# Patient Record
Sex: FEMALE | Race: WHITE | NOT HISPANIC OR LATINO | Employment: FULL TIME | URBAN - METROPOLITAN AREA
[De-identification: names, ages, dates, MRNs, and addresses within clinical notes are randomized per-mention and may not be internally consistent; named-entity substitution may affect disease eponyms.]

---

## 2022-05-03 ENCOUNTER — OFFICE VISIT (OUTPATIENT)
Dept: OBSTETRICS AND GYNECOLOGY | Facility: CLINIC | Age: 52
End: 2022-05-03
Payer: COMMERCIAL

## 2022-05-03 VITALS
DIASTOLIC BLOOD PRESSURE: 82 MMHG | HEIGHT: 65 IN | BODY MASS INDEX: 30.07 KG/M2 | WEIGHT: 180.5 LBS | SYSTOLIC BLOOD PRESSURE: 128 MMHG

## 2022-05-03 DIAGNOSIS — Z78.9 PARTICIPANT IN HEALTH AND WELLNESS PLAN: ICD-10-CM

## 2022-05-03 DIAGNOSIS — Z12.31 SCREENING MAMMOGRAM FOR BREAST CANCER: Primary | ICD-10-CM

## 2022-05-03 DIAGNOSIS — N39.3 STRESS INCONTINENCE, FEMALE: ICD-10-CM

## 2022-05-03 PROCEDURE — 99386 PR PREVENTIVE VISIT,NEW,40-64: ICD-10-PCS | Mod: S$GLB,,, | Performed by: OBSTETRICS & GYNECOLOGY

## 2022-05-03 PROCEDURE — 3074F PR MOST RECENT SYSTOLIC BLOOD PRESSURE < 130 MM HG: ICD-10-PCS | Mod: S$GLB,,, | Performed by: OBSTETRICS & GYNECOLOGY

## 2022-05-03 PROCEDURE — 1159F PR MEDICATION LIST DOCUMENTED IN MEDICAL RECORD: ICD-10-PCS | Mod: S$GLB,,, | Performed by: OBSTETRICS & GYNECOLOGY

## 2022-05-03 PROCEDURE — 1160F PR REVIEW ALL MEDS BY PRESCRIBER/CLIN PHARMACIST DOCUMENTED: ICD-10-PCS | Mod: S$GLB,,, | Performed by: OBSTETRICS & GYNECOLOGY

## 2022-05-03 PROCEDURE — 3079F PR MOST RECENT DIASTOLIC BLOOD PRESSURE 80-89 MM HG: ICD-10-PCS | Mod: S$GLB,,, | Performed by: OBSTETRICS & GYNECOLOGY

## 2022-05-03 PROCEDURE — 3074F SYST BP LT 130 MM HG: CPT | Mod: S$GLB,,, | Performed by: OBSTETRICS & GYNECOLOGY

## 2022-05-03 PROCEDURE — 1159F MED LIST DOCD IN RCRD: CPT | Mod: S$GLB,,, | Performed by: OBSTETRICS & GYNECOLOGY

## 2022-05-03 PROCEDURE — 3008F BODY MASS INDEX DOCD: CPT | Mod: S$GLB,,, | Performed by: OBSTETRICS & GYNECOLOGY

## 2022-05-03 PROCEDURE — 1160F RVW MEDS BY RX/DR IN RCRD: CPT | Mod: S$GLB,,, | Performed by: OBSTETRICS & GYNECOLOGY

## 2022-05-03 PROCEDURE — 87086 URINE CULTURE/COLONY COUNT: CPT | Performed by: OBSTETRICS & GYNECOLOGY

## 2022-05-03 PROCEDURE — 3079F DIAST BP 80-89 MM HG: CPT | Mod: S$GLB,,, | Performed by: OBSTETRICS & GYNECOLOGY

## 2022-05-03 PROCEDURE — 3008F PR BODY MASS INDEX (BMI) DOCUMENTED: ICD-10-PCS | Mod: S$GLB,,, | Performed by: OBSTETRICS & GYNECOLOGY

## 2022-05-03 PROCEDURE — 99386 PREV VISIT NEW AGE 40-64: CPT | Mod: S$GLB,,, | Performed by: OBSTETRICS & GYNECOLOGY

## 2022-05-03 RX ORDER — LISDEXAMFETAMINE DIMESYLATE 70 MG/1
70 CAPSULE ORAL EVERY MORNING
COMMUNITY
Start: 2022-04-03

## 2022-05-03 RX ORDER — SERTRALINE HYDROCHLORIDE 25 MG/1
25 TABLET, FILM COATED ORAL DAILY
COMMUNITY
Start: 2022-04-22

## 2022-05-03 RX ORDER — BUPROPION HYDROCHLORIDE 300 MG/1
300 TABLET ORAL DAILY
COMMUNITY
Start: 2022-04-22

## 2022-05-03 NOTE — PROGRESS NOTES
Subjective:       Patient ID: Allie Coto is a 51 y.o. female.    Chief Complaint: Establish Care (Pt is new to the clinic and would like to est. Care. Pt states she has been experiencing some stress incontinent   episodes.)  Pt moved from [facility]-now working at [employer] and is establishing care-not due for her pap until after mid June  Struggles with stress incontinence and curious about how she can improve her symptoms   Menses is regular-maybe a little heavier than normal  No FMH of BRCA or colon CA  HPI  Review of Systems   Constitutional: Negative for chills and fever.   Gastrointestinal: Positive for constipation. Negative for abdominal pain, diarrhea and nausea.   Genitourinary: Positive for bladder incontinence and urgency. Negative for dysuria, flank pain, frequency, hematuria, pelvic pain and vaginal discharge.   Musculoskeletal: Positive for back pain.   Integumentary:  Negative for rash.   Neurological: Positive for headaches.         Objective:      Physical Exam  Vitals and nursing note reviewed. Exam conducted with a chaperone present.   Constitutional:       Appearance: Normal appearance.   HENT:      Head: Normocephalic and atraumatic.   Pulmonary:      Effort: Pulmonary effort is normal.   Musculoskeletal:      Cervical back: Normal range of motion and neck supple.   Neurological:      Mental Status: She is alert.         Assessment:       Problem List Items Addressed This Visit    None         Plan:       Screening mammogram for breast cancer  -     Mammo Digital Screening Bilat; Future; Expected date: 05/03/2022    Stress incontinence, female  -     Urine culture    Participant in health and wellness plan  -     Hemoglobin A1C; Future; Expected date: 05/03/2022  -     Lipid Panel; Future; Expected date: 05/03/2022  -     Ambulatory referral/consult to Gastroenterology; Future; Expected date: 05/10/2022      Disc Kegels and pelvic floor PT and poss of surgical procedures if lifestyle is badly  affected  Counseled about menopause and annual exams  Will return for labs and have MMg and colonoscopy

## 2022-05-05 LAB — BACTERIA UR CULT: NORMAL

## 2022-05-06 ENCOUNTER — LAB VISIT (OUTPATIENT)
Dept: LAB | Facility: CLINIC | Age: 52
End: 2022-05-06
Payer: COMMERCIAL

## 2022-05-06 DIAGNOSIS — Z78.9 PARTICIPANT IN HEALTH AND WELLNESS PLAN: ICD-10-CM

## 2022-05-06 LAB
CHOLEST SERPL-MCNC: 131 MG/DL (ref 120–199)
CHOLEST/HDLC SERPL: 3.4 {RATIO} (ref 2–5)
HDLC SERPL-MCNC: 39 MG/DL (ref 40–75)
HDLC SERPL: 29.8 % (ref 20–50)
LDLC SERPL CALC-MCNC: 83 MG/DL (ref 63–159)
NONHDLC SERPL-MCNC: 92 MG/DL
TRIGL SERPL-MCNC: 45 MG/DL (ref 30–150)

## 2022-05-06 PROCEDURE — 36415 COLL VENOUS BLD VENIPUNCTURE: CPT | Mod: ,,, | Performed by: OBSTETRICS & GYNECOLOGY

## 2022-05-06 PROCEDURE — 83036 HEMOGLOBIN GLYCOSYLATED A1C: CPT | Performed by: OBSTETRICS & GYNECOLOGY

## 2022-05-06 PROCEDURE — 36415 PR COLLECTION VENOUS BLOOD,VENIPUNCTURE: ICD-10-PCS | Mod: ,,, | Performed by: OBSTETRICS & GYNECOLOGY

## 2022-05-06 PROCEDURE — 80061 LIPID PANEL: CPT | Performed by: OBSTETRICS & GYNECOLOGY

## 2022-05-07 LAB
ESTIMATED AVG GLUCOSE: 108 MG/DL (ref 68–131)
HBA1C MFR BLD: 5.4 % (ref 4–5.6)

## 2022-05-09 ENCOUNTER — TELEPHONE (OUTPATIENT)
Dept: OBSTETRICS AND GYNECOLOGY | Facility: CLINIC | Age: 52
End: 2022-05-09
Payer: COMMERCIAL

## 2022-05-09 NOTE — TELEPHONE ENCOUNTER
"Called pt no answer, message left.  ----- Message from Orion Bruce MD sent at 5/9/2022 10:41 AM CDT -----  HDL "good" cholesterol is borderline, otherwise all labs are normal.  Rec increase exercise and fresh fruits and vegetables in her diet  Avoid fried and fatty foods  Dr Bruce    "

## 2022-05-09 NOTE — TELEPHONE ENCOUNTER
Called pt no answer, message left.    ----- Message from Orion Bruce MD sent at 5/9/2022  9:13 AM CDT -----  Urine culture is negative  Dr Bruce

## 2022-05-20 ENCOUNTER — PATIENT MESSAGE (OUTPATIENT)
Dept: OBSTETRICS AND GYNECOLOGY | Facility: CLINIC | Age: 52
End: 2022-05-20
Payer: COMMERCIAL

## 2022-06-15 ENCOUNTER — PATIENT MESSAGE (OUTPATIENT)
Dept: OBSTETRICS AND GYNECOLOGY | Facility: CLINIC | Age: 52
End: 2022-06-15
Payer: COMMERCIAL

## 2022-06-16 DIAGNOSIS — B37.31 VAGINAL YEAST INFECTION: Primary | ICD-10-CM

## 2022-06-16 RX ORDER — FLUCONAZOLE 150 MG/1
150 TABLET ORAL DAILY
Qty: 1 TABLET | Refills: 1 | Status: SHIPPED | OUTPATIENT
Start: 2022-06-16 | End: 2022-06-17

## 2022-10-27 ENCOUNTER — OFFICE VISIT (OUTPATIENT)
Dept: OBSTETRICS AND GYNECOLOGY | Facility: CLINIC | Age: 52
End: 2022-10-27
Payer: COMMERCIAL

## 2022-10-27 VITALS
WEIGHT: 180.31 LBS | TEMPERATURE: 98 F | DIASTOLIC BLOOD PRESSURE: 90 MMHG | OXYGEN SATURATION: 100 % | BODY MASS INDEX: 30.04 KG/M2 | SYSTOLIC BLOOD PRESSURE: 140 MMHG | HEART RATE: 82 BPM | HEIGHT: 65 IN

## 2022-10-27 DIAGNOSIS — Z01.419 WOMEN'S ANNUAL ROUTINE GYNECOLOGICAL EXAMINATION: Primary | ICD-10-CM

## 2022-10-27 DIAGNOSIS — R19.00 PELVIC FULLNESS IN FEMALE: ICD-10-CM

## 2022-10-27 PROCEDURE — 3077F SYST BP >= 140 MM HG: CPT | Mod: S$GLB,,, | Performed by: OBSTETRICS & GYNECOLOGY

## 2022-10-27 PROCEDURE — 87624 HPV HI-RISK TYP POOLED RSLT: CPT | Performed by: OBSTETRICS & GYNECOLOGY

## 2022-10-27 PROCEDURE — 1160F RVW MEDS BY RX/DR IN RCRD: CPT | Mod: S$GLB,,, | Performed by: OBSTETRICS & GYNECOLOGY

## 2022-10-27 PROCEDURE — 3044F HG A1C LEVEL LT 7.0%: CPT | Mod: S$GLB,,, | Performed by: OBSTETRICS & GYNECOLOGY

## 2022-10-27 PROCEDURE — 3080F PR MOST RECENT DIASTOLIC BLOOD PRESSURE >= 90 MM HG: ICD-10-PCS | Mod: S$GLB,,, | Performed by: OBSTETRICS & GYNECOLOGY

## 2022-10-27 PROCEDURE — 3044F PR MOST RECENT HEMOGLOBIN A1C LEVEL <7.0%: ICD-10-PCS | Mod: S$GLB,,, | Performed by: OBSTETRICS & GYNECOLOGY

## 2022-10-27 PROCEDURE — 1159F PR MEDICATION LIST DOCUMENTED IN MEDICAL RECORD: ICD-10-PCS | Mod: S$GLB,,, | Performed by: OBSTETRICS & GYNECOLOGY

## 2022-10-27 PROCEDURE — 1160F PR REVIEW ALL MEDS BY PRESCRIBER/CLIN PHARMACIST DOCUMENTED: ICD-10-PCS | Mod: S$GLB,,, | Performed by: OBSTETRICS & GYNECOLOGY

## 2022-10-27 PROCEDURE — 3077F PR MOST RECENT SYSTOLIC BLOOD PRESSURE >= 140 MM HG: ICD-10-PCS | Mod: S$GLB,,, | Performed by: OBSTETRICS & GYNECOLOGY

## 2022-10-27 PROCEDURE — 88175 CYTOPATH C/V AUTO FLUID REDO: CPT | Performed by: OBSTETRICS & GYNECOLOGY

## 2022-10-27 PROCEDURE — 1159F MED LIST DOCD IN RCRD: CPT | Mod: S$GLB,,, | Performed by: OBSTETRICS & GYNECOLOGY

## 2022-10-27 PROCEDURE — 99396 PR PREVENTIVE VISIT,EST,40-64: ICD-10-PCS | Mod: S$GLB,,, | Performed by: OBSTETRICS & GYNECOLOGY

## 2022-10-27 PROCEDURE — 3080F DIAST BP >= 90 MM HG: CPT | Mod: S$GLB,,, | Performed by: OBSTETRICS & GYNECOLOGY

## 2022-10-27 PROCEDURE — 99396 PREV VISIT EST AGE 40-64: CPT | Mod: S$GLB,,, | Performed by: OBSTETRICS & GYNECOLOGY

## 2022-10-27 NOTE — PROGRESS NOTES
Subjective:       Patient ID: Allie Coto is a 52 y.o. female.    Chief Complaint: Well Woman (Patient c/o feeling bloated.)  No complaints-some variety in menstrual length and amount of bleeding-still comes monthly-having some HF, no Vag dryness, needs pap and c/o pelvic fullness and no change in BM-has BM every 1-3 days  No other c/o  Had labs done in May-normal, had MMG, has not had a colonoscopy  HPI  Review of Systems   Genitourinary:  Positive for hot flashes and menstrual irregularity.       Objective:      Physical Exam  Vitals and nursing note reviewed. Exam conducted with a chaperone present.   Constitutional:       Appearance: Normal appearance. She is obese.   HENT:      Head: Normocephalic and atraumatic.   Cardiovascular:      Rate and Rhythm: Regular rhythm.      Heart sounds: Normal heart sounds.   Pulmonary:      Effort: Pulmonary effort is normal.      Breath sounds: Normal breath sounds.   Chest:   Breasts:     Right: Normal.      Left: Normal.   Genitourinary:     General: Normal vulva.      Exam position: Lithotomy position.      Vagina: Normal.      Cervix: Normal.      Uterus: Normal.       Adnexa:         Right: No mass, tenderness or fullness.          Left: Tenderness and fullness present. No mass.        Comments: Left adnexal fullness with some tenderness  Musculoskeletal:      Cervical back: Normal range of motion and neck supple.   Neurological:      Mental Status: She is alert.       Assessment:       Problem List Items Addressed This Visit    None  Visit Diagnoses       Women's annual routine gynecological examination    -  Primary              Plan:       Women's annual routine gynecological examination  -     Liquid-Based Pap Smear, Screening  -     HPV High Risk Genotypes, PCR  -     Ambulatory referral/consult to Gastroenterology; Future; Expected date: 11/03/2022    Pelvic fullness in female  -     US Pelvis Comp with Transvag NON-OB (xpd; Future; Expected date: 10/27/2022  -      Ambulatory referral/consult to Gastroenterology; Future; Expected date: 11/03/2022    RTC for annual. Will follow up with pt after U/S

## 2022-11-02 LAB
FINAL PATHOLOGIC DIAGNOSIS: NORMAL
HPV HR 12 DNA SPEC QL NAA+PROBE: NEGATIVE
HPV16 AG SPEC QL: NEGATIVE
HPV18 DNA SPEC QL NAA+PROBE: NEGATIVE
Lab: NORMAL

## 2022-11-03 ENCOUNTER — TELEPHONE (OUTPATIENT)
Dept: OBSTETRICS AND GYNECOLOGY | Facility: CLINIC | Age: 52
End: 2022-11-03
Payer: COMMERCIAL

## 2022-11-03 NOTE — TELEPHONE ENCOUNTER
----- Message from Orion Bruce MD sent at 11/3/2022  2:10 PM CDT -----  Normal Pap and negative HPV  Dr Bruce

## 2022-12-20 ENCOUNTER — PATIENT MESSAGE (OUTPATIENT)
Dept: OBSTETRICS AND GYNECOLOGY | Facility: CLINIC | Age: 52
End: 2022-12-20
Payer: COMMERCIAL

## 2023-01-24 ENCOUNTER — PATIENT MESSAGE (OUTPATIENT)
Dept: OBSTETRICS AND GYNECOLOGY | Facility: CLINIC | Age: 53
End: 2023-01-24
Payer: COMMERCIAL

## 2023-01-31 ENCOUNTER — PATIENT MESSAGE (OUTPATIENT)
Dept: OBSTETRICS AND GYNECOLOGY | Facility: CLINIC | Age: 53
End: 2023-01-31
Payer: COMMERCIAL

## 2023-03-21 ENCOUNTER — TELEPHONE (OUTPATIENT)
Dept: OBSTETRICS AND GYNECOLOGY | Facility: CLINIC | Age: 53
End: 2023-03-21

## 2023-03-21 ENCOUNTER — OFFICE VISIT (OUTPATIENT)
Dept: OBSTETRICS AND GYNECOLOGY | Facility: CLINIC | Age: 53
End: 2023-03-21
Payer: COMMERCIAL

## 2023-03-21 VITALS
OXYGEN SATURATION: 100 % | BODY MASS INDEX: 27.16 KG/M2 | SYSTOLIC BLOOD PRESSURE: 141 MMHG | TEMPERATURE: 98 F | HEIGHT: 65 IN | DIASTOLIC BLOOD PRESSURE: 74 MMHG | HEART RATE: 79 BPM | WEIGHT: 163 LBS

## 2023-03-21 DIAGNOSIS — D21.9 FIBROIDS: ICD-10-CM

## 2023-03-21 DIAGNOSIS — N95.1 PERIMENOPAUSE: ICD-10-CM

## 2023-03-21 DIAGNOSIS — N92.6 IRREGULAR MENSES: Primary | ICD-10-CM

## 2023-03-21 DIAGNOSIS — N92.1 MENORRHAGIA WITH IRREGULAR CYCLE: ICD-10-CM

## 2023-03-21 LAB
BASOPHILS # BLD AUTO: 0.05 K/UL (ref 0–0.2)
BASOPHILS NFR BLD: 0.8 % (ref 0–1.9)
DIFFERENTIAL METHOD: ABNORMAL
EOSINOPHIL # BLD AUTO: 0.3 K/UL (ref 0–0.5)
EOSINOPHIL NFR BLD: 4.3 % (ref 0–8)
ERYTHROCYTE [DISTWIDTH] IN BLOOD BY AUTOMATED COUNT: 16.8 % (ref 11.5–14.5)
HCT VFR BLD AUTO: 36.4 % (ref 37–48.5)
HGB BLD-MCNC: 11 G/DL (ref 12–16)
IMM GRANULOCYTES # BLD AUTO: 0.01 K/UL (ref 0–0.04)
IMM GRANULOCYTES NFR BLD AUTO: 0.2 % (ref 0–0.5)
LYMPHOCYTES # BLD AUTO: 1.5 K/UL (ref 1–4.8)
LYMPHOCYTES NFR BLD: 22.6 % (ref 18–48)
MCH RBC QN AUTO: 22 PG (ref 27–31)
MCHC RBC AUTO-ENTMCNC: 30.2 G/DL (ref 32–36)
MCV RBC AUTO: 73 FL (ref 82–98)
MONOCYTES # BLD AUTO: 0.5 K/UL (ref 0.3–1)
MONOCYTES NFR BLD: 7.8 % (ref 4–15)
NEUTROPHILS # BLD AUTO: 4.2 K/UL (ref 1.8–7.7)
NEUTROPHILS NFR BLD: 64.3 % (ref 38–73)
NRBC BLD-RTO: 0 /100 WBC
PLATELET # BLD AUTO: 283 K/UL (ref 150–450)
PMV BLD AUTO: 10.8 FL (ref 9.2–12.9)
RBC # BLD AUTO: 5.01 M/UL (ref 4–5.4)
T4 FREE SERPL-MCNC: 1.1 NG/DL (ref 0.71–1.51)
TSH SERPL DL<=0.005 MIU/L-ACNC: 1.71 UIU/ML (ref 0.4–4)
WBC # BLD AUTO: 6.55 K/UL (ref 3.9–12.7)

## 2023-03-21 PROCEDURE — 99214 OFFICE O/P EST MOD 30 MIN: CPT | Mod: S$GLB,,, | Performed by: OBSTETRICS & GYNECOLOGY

## 2023-03-21 PROCEDURE — 3008F PR BODY MASS INDEX (BMI) DOCUMENTED: ICD-10-PCS | Mod: S$GLB,,, | Performed by: OBSTETRICS & GYNECOLOGY

## 2023-03-21 PROCEDURE — 84439 ASSAY OF FREE THYROXINE: CPT | Performed by: OBSTETRICS & GYNECOLOGY

## 2023-03-21 PROCEDURE — 84443 ASSAY THYROID STIM HORMONE: CPT | Performed by: OBSTETRICS & GYNECOLOGY

## 2023-03-21 PROCEDURE — 1159F PR MEDICATION LIST DOCUMENTED IN MEDICAL RECORD: ICD-10-PCS | Mod: S$GLB,,, | Performed by: OBSTETRICS & GYNECOLOGY

## 2023-03-21 PROCEDURE — 3078F DIAST BP <80 MM HG: CPT | Mod: S$GLB,,, | Performed by: OBSTETRICS & GYNECOLOGY

## 2023-03-21 PROCEDURE — 3077F PR MOST RECENT SYSTOLIC BLOOD PRESSURE >= 140 MM HG: ICD-10-PCS | Mod: S$GLB,,, | Performed by: OBSTETRICS & GYNECOLOGY

## 2023-03-21 PROCEDURE — 1159F MED LIST DOCD IN RCRD: CPT | Mod: S$GLB,,, | Performed by: OBSTETRICS & GYNECOLOGY

## 2023-03-21 PROCEDURE — 3078F PR MOST RECENT DIASTOLIC BLOOD PRESSURE < 80 MM HG: ICD-10-PCS | Mod: S$GLB,,, | Performed by: OBSTETRICS & GYNECOLOGY

## 2023-03-21 PROCEDURE — 85025 COMPLETE CBC W/AUTO DIFF WBC: CPT | Performed by: OBSTETRICS & GYNECOLOGY

## 2023-03-21 PROCEDURE — 3077F SYST BP >= 140 MM HG: CPT | Mod: S$GLB,,, | Performed by: OBSTETRICS & GYNECOLOGY

## 2023-03-21 PROCEDURE — 99214 PR OFFICE/OUTPT VISIT, EST, LEVL IV, 30-39 MIN: ICD-10-PCS | Mod: S$GLB,,, | Performed by: OBSTETRICS & GYNECOLOGY

## 2023-03-21 PROCEDURE — 3008F BODY MASS INDEX DOCD: CPT | Mod: S$GLB,,, | Performed by: OBSTETRICS & GYNECOLOGY

## 2023-03-21 NOTE — PROGRESS NOTES
Subjective:       Patient ID: Allie Coto is a 52 y.o. female.    Chief Complaint: Vaginal Bleeding (Has always had heavy cycles but for the past 4 months has been so bad she does not want to leave the house. )  Pt is here to disc menses-has always had heavy days but it is worse-soiling clothes and affecting daily life-the last few months have been worse, longest period without bleeding was about 2 months as she approaches menopause, U/S done in Jan 2023-2 fibroids, normal ovaries, EMS 1 cm  Desires some intervention and is not interested in hysterectomy  No Fmh of DVT, no personal hx of CV ds, took Vyvanse before coming and it elevates her BP for a little while  Reviewed chart and last exam done on 10/27/23, reviewed U/S  Vaginal Bleeding    Review of Systems   Genitourinary:  Positive for menstrual irregularity and vaginal bleeding.       Objective:      Physical Exam  Vitals and nursing note reviewed.   Constitutional:       Appearance: Normal appearance.   HENT:      Head: Normocephalic and atraumatic.   Pulmonary:      Effort: Pulmonary effort is normal.   Neurological:      Mental Status: She is alert.   Psychiatric:         Mood and Affect: Mood normal.         Behavior: Behavior normal.         Thought Content: Thought content normal.       Assessment:       Problem List Items Addressed This Visit    None  Visit Diagnoses       Irregular menses    -  Primary    Relevant Orders    T4, Free    TSH    Menorrhagia with irregular cycle        Relevant Orders    CBC Auto Differential    T4, Free    TSH    Perimenopause        Fibroids                  Plan:       Irregular menses  -     T4, Free; Future; Expected date: 03/21/2023  -     TSH; Future; Expected date: 03/21/2023    Menorrhagia with irregular cycle  -     CBC Auto Differential; Future; Expected date: 03/21/2023  -     T4, Free; Future; Expected date: 03/21/2023  -     TSH; Future; Expected date: 03/21/2023    Perimenopause    Fibroids      Disc  ablation and disc OCPs as an option to help transition through menopause  Pt would like to pursue ablation  Will refer to Dr Mae for pre op consult

## 2023-03-23 NOTE — PROGRESS NOTES
Mild anemia, likely due to blood loss with menses. Thyroid studies are normal. Rec multivitamin and OTC iron supplement and continue with plan for ablation.  Dr Bruce

## 2023-03-27 ENCOUNTER — OFFICE VISIT (OUTPATIENT)
Dept: OBSTETRICS AND GYNECOLOGY | Facility: CLINIC | Age: 53
End: 2023-03-27
Payer: COMMERCIAL

## 2023-03-27 VITALS
HEIGHT: 65 IN | DIASTOLIC BLOOD PRESSURE: 95 MMHG | WEIGHT: 182 LBS | BODY MASS INDEX: 30.32 KG/M2 | SYSTOLIC BLOOD PRESSURE: 145 MMHG

## 2023-03-27 DIAGNOSIS — N92.1 MENORRHAGIA WITH IRREGULAR CYCLE: Primary | ICD-10-CM

## 2023-03-27 PROCEDURE — 1159F PR MEDICATION LIST DOCUMENTED IN MEDICAL RECORD: ICD-10-PCS | Mod: S$GLB,,, | Performed by: OBSTETRICS & GYNECOLOGY

## 2023-03-27 PROCEDURE — 99214 OFFICE O/P EST MOD 30 MIN: CPT | Mod: S$GLB,,, | Performed by: OBSTETRICS & GYNECOLOGY

## 2023-03-27 PROCEDURE — 3008F PR BODY MASS INDEX (BMI) DOCUMENTED: ICD-10-PCS | Mod: S$GLB,,, | Performed by: OBSTETRICS & GYNECOLOGY

## 2023-03-27 PROCEDURE — 3080F PR MOST RECENT DIASTOLIC BLOOD PRESSURE >= 90 MM HG: ICD-10-PCS | Mod: S$GLB,,, | Performed by: OBSTETRICS & GYNECOLOGY

## 2023-03-27 PROCEDURE — 3077F SYST BP >= 140 MM HG: CPT | Mod: S$GLB,,, | Performed by: OBSTETRICS & GYNECOLOGY

## 2023-03-27 PROCEDURE — 3080F DIAST BP >= 90 MM HG: CPT | Mod: S$GLB,,, | Performed by: OBSTETRICS & GYNECOLOGY

## 2023-03-27 PROCEDURE — 3077F PR MOST RECENT SYSTOLIC BLOOD PRESSURE >= 140 MM HG: ICD-10-PCS | Mod: S$GLB,,, | Performed by: OBSTETRICS & GYNECOLOGY

## 2023-03-27 PROCEDURE — 99214 PR OFFICE/OUTPT VISIT, EST, LEVL IV, 30-39 MIN: ICD-10-PCS | Mod: S$GLB,,, | Performed by: OBSTETRICS & GYNECOLOGY

## 2023-03-27 PROCEDURE — 1159F MED LIST DOCD IN RCRD: CPT | Mod: S$GLB,,, | Performed by: OBSTETRICS & GYNECOLOGY

## 2023-03-27 PROCEDURE — 3008F BODY MASS INDEX DOCD: CPT | Mod: S$GLB,,, | Performed by: OBSTETRICS & GYNECOLOGY

## 2023-03-27 NOTE — PROGRESS NOTES
"Gynecology Visit  History & Physical      SUBJECTIVE:     History of Present Illness:  Patient is a 52 y.o. female presents as a referral from Dr. Bruce. Reports that over the past 6 months, her periods have become heavier and irregular; occurring every 6 to 8 weeks. She had an u/s performed by [facility] that showed fibroids. She is interested in surgical treatment.     Chief Complaint   Patient presents with    Consult     Pt is here today for consult visit.       Review of patient's allergies indicates:   Allergen Reactions    Penicillins Hives, Itching and Rash       Current Outpatient Medications   Medication Sig Dispense Refill    buPROPion (WELLBUTRIN XL) 300 MG 24 hr tablet Take 300 mg by mouth once daily.      sertraline (ZOLOFT) 25 MG tablet Take 25 mg by mouth once daily.      VYVANSE 70 mg capsule Take 70 mg by mouth every morning.       No current facility-administered medications for this visit.     OB History          3    Para   3    Term   3            AB        Living   3         SAB        IAB        Ectopic        Multiple        Live Births   3             Patient's last menstrual period was 02/10/2023 (approximate).      Past Medical History:   Diagnosis Date    Breast disorder     Postpartum depression     Rh incompatibility     Trauma      Past Surgical History:   Procedure Laterality Date    BREAST BIOPSY      lab band      TUBAL LIGATION       History reviewed. No pertinent family history.  Social History     Tobacco Use    Smoking status: Never    Smokeless tobacco: Never   Substance Use Topics    Alcohol use: Not Currently    Drug use: Never        OBJECTIVE:     Vital Signs (Most Recent)  BP: (!) 145/95 (23 1025)  5' 5" (1.651 m)  82.6 kg (182 lb)     Physical Exam:  Physical Exam  Vitals reviewed.   Constitutional:       Appearance: Normal appearance.   HENT:      Head: Normocephalic and atraumatic.   Pulmonary:      Effort: Pulmonary effort is normal. "   Neurological:      General: No focal deficit present.      Mental Status: She is alert and oriented to person, place, and time.   Psychiatric:         Mood and Affect: Mood normal.         Behavior: Behavior normal.       Pelvic u/s performed on 1/12/2023:  Uterus measures 9.8 x 6.4 x 6.4 cm  Multiple small fibroids and/ or adenomyosis  EMS measures 1mm  Normal appearing bilateral ovaries    ASSESSMENT/PLAN:       ICD-10-CM ICD-9-CM   1. Menorrhagia with irregular cycle  N92.1 626.2       PLAN:    --Pt is perimenopausal. Menstrual changes are likely hormonal.   --FSH/ E2 ordered to assess if she is in menopause. If so, endometrial ablation is not appropriate and will discuss IUD vs hysterectomy.   --EMB not indicated given thin EMS.   --Will call patient with lab results and follow up plan.   --Consider TXA.     Last Pap: 10/27/2022 ASTER Mae MD   Gynecology    [address]  [address]  [place] [address] [phone]

## 2023-03-29 ENCOUNTER — LAB VISIT (OUTPATIENT)
Dept: LAB | Facility: CLINIC | Age: 53
End: 2023-03-29
Payer: COMMERCIAL

## 2023-03-29 DIAGNOSIS — N92.1 MENORRHAGIA WITH IRREGULAR CYCLE: ICD-10-CM

## 2023-03-29 PROCEDURE — 82670 ASSAY OF TOTAL ESTRADIOL: CPT | Performed by: OBSTETRICS & GYNECOLOGY

## 2023-03-29 PROCEDURE — 83001 ASSAY OF GONADOTROPIN (FSH): CPT | Performed by: OBSTETRICS & GYNECOLOGY

## 2023-03-30 PROCEDURE — 36415 COLL VENOUS BLD VENIPUNCTURE: CPT | Mod: ,,, | Performed by: STUDENT IN AN ORGANIZED HEALTH CARE EDUCATION/TRAINING PROGRAM

## 2023-03-30 PROCEDURE — 36415 PR COLLECTION VENOUS BLOOD,VENIPUNCTURE: ICD-10-PCS | Mod: ,,, | Performed by: STUDENT IN AN ORGANIZED HEALTH CARE EDUCATION/TRAINING PROGRAM

## 2023-03-31 LAB
ESTRADIOL SERPL-MCNC: 11 PG/ML
FSH SERPL-ACNC: 54.93 MIU/ML

## 2023-04-12 ENCOUNTER — TELEPHONE (OUTPATIENT)
Dept: OBSTETRICS AND GYNECOLOGY | Facility: CLINIC | Age: 53
End: 2023-04-12
Payer: COMMERCIAL

## 2023-04-12 NOTE — TELEPHONE ENCOUNTER
----- Message from Sabrina Mae MD sent at 4/3/2023  1:51 PM CDT -----  Please schedule a virtual appointment to discuss lab results and treatment plan. Pt is welcome to come to the office for an in person appointment if she desires.

## 2023-04-14 ENCOUNTER — TELEPHONE (OUTPATIENT)
Dept: OBSTETRICS AND GYNECOLOGY | Facility: CLINIC | Age: 53
End: 2023-04-14
Payer: COMMERCIAL

## 2023-04-24 ENCOUNTER — PATIENT MESSAGE (OUTPATIENT)
Dept: OBSTETRICS AND GYNECOLOGY | Facility: CLINIC | Age: 53
End: 2023-04-24
Payer: COMMERCIAL

## 2023-07-12 ENCOUNTER — OFFICE VISIT (OUTPATIENT)
Dept: OBSTETRICS AND GYNECOLOGY | Facility: CLINIC | Age: 53
End: 2023-07-12
Payer: COMMERCIAL

## 2023-07-12 VITALS
WEIGHT: 185 LBS | HEIGHT: 65 IN | BODY MASS INDEX: 30.82 KG/M2 | DIASTOLIC BLOOD PRESSURE: 88 MMHG | SYSTOLIC BLOOD PRESSURE: 138 MMHG

## 2023-07-12 DIAGNOSIS — D25.1 INTRAMURAL LEIOMYOMA OF UTERUS: Primary | ICD-10-CM

## 2023-07-12 DIAGNOSIS — N95.0 POST-MENOPAUSAL BLEEDING: ICD-10-CM

## 2023-07-12 PROCEDURE — 1159F MED LIST DOCD IN RCRD: CPT | Mod: S$GLB,,, | Performed by: OBSTETRICS & GYNECOLOGY

## 2023-07-12 PROCEDURE — 3079F DIAST BP 80-89 MM HG: CPT | Mod: S$GLB,,, | Performed by: OBSTETRICS & GYNECOLOGY

## 2023-07-12 PROCEDURE — 3008F PR BODY MASS INDEX (BMI) DOCUMENTED: ICD-10-PCS | Mod: S$GLB,,, | Performed by: OBSTETRICS & GYNECOLOGY

## 2023-07-12 PROCEDURE — 1159F PR MEDICATION LIST DOCUMENTED IN MEDICAL RECORD: ICD-10-PCS | Mod: S$GLB,,, | Performed by: OBSTETRICS & GYNECOLOGY

## 2023-07-12 PROCEDURE — 3075F SYST BP GE 130 - 139MM HG: CPT | Mod: S$GLB,,, | Performed by: OBSTETRICS & GYNECOLOGY

## 2023-07-12 PROCEDURE — 99214 OFFICE O/P EST MOD 30 MIN: CPT | Mod: S$GLB,,, | Performed by: OBSTETRICS & GYNECOLOGY

## 2023-07-12 PROCEDURE — 99214 PR OFFICE/OUTPT VISIT, EST, LEVL IV, 30-39 MIN: ICD-10-PCS | Mod: S$GLB,,, | Performed by: OBSTETRICS & GYNECOLOGY

## 2023-07-12 PROCEDURE — 3008F BODY MASS INDEX DOCD: CPT | Mod: S$GLB,,, | Performed by: OBSTETRICS & GYNECOLOGY

## 2023-07-12 PROCEDURE — 3079F PR MOST RECENT DIASTOLIC BLOOD PRESSURE 80-89 MM HG: ICD-10-PCS | Mod: S$GLB,,, | Performed by: OBSTETRICS & GYNECOLOGY

## 2023-07-12 PROCEDURE — 3075F PR MOST RECENT SYSTOLIC BLOOD PRESS GE 130-139MM HG: ICD-10-PCS | Mod: S$GLB,,, | Performed by: OBSTETRICS & GYNECOLOGY

## 2023-07-12 NOTE — PROGRESS NOTES
"Gynecology Visit     Subjective:       Patient ID: Allie Coto is a 52 y.o. female.    Chief Complaint:  follow up      History of Present Illness  52-year-old  with postmenopausal bleeding presents to review lab results.  Her pelvic ultrasound showed a 10 week size uterus with multiple fibroids and a thin endometrium measuring 1 mm.  She reports that she is continued to have intermittent spotting since I last saw her in March.    GYN & OB History  No LMP recorded.   Date of Last Pap: 2022    OB History    Para Term  AB Living   3 3 3     3   SAB IAB Ectopic Multiple Live Births           3      # Outcome Date GA Lbr Miki/2nd Weight Sex Delivery Anes PTL Lv   3 Term            2 Term            1 Term                    /88 (BP Location: Right arm, Patient Position: Sitting)   Ht 5' 5" (1.651 m)   Wt 83.9 kg (185 lb)   BMI 30.79 kg/m²     Objective:    Physical Exam:   Constitutional: She is oriented to person, place, and time. She appears well-developed and well-nourished.    HENT:   Head: Normocephalic and atraumatic.       Pulmonary/Chest: Effort normal.                      Neurological: She is alert and oriented to person, place, and time.     Psychiatric: She has a normal mood and affect.          10/27/2022: NILM Pap  3/29/2023: FSH 55, E2 11, TSH 1.7, Hgb 11    Pelvic u/s performed on 2023:  Uterus measures 9.8 x 6.4 x 6.4 cm  Multiple small fibroids and/ or adenomyosis  EMS measures 1mm  Normal appearing bilateral ovaries    Assessment:        1. Intramural leiomyoma of uterus    2. Post-menopausal bleeding             Plan:      Minimal risk of endometrial cancer given thin endometrium.   Endometrial ablation is contraindicated given postmenopausal status.   Recommend hysterectomy. Pt desires BSO.   Plan for surgery on  at [facility] [place].   Follow up for pre-op visit.       Sabrina Mae MD   Gynecology    [address] [facility]   [address]  [place] " [address] [phone]

## 2023-07-19 ENCOUNTER — TELEPHONE (OUTPATIENT)
Dept: OBSTETRICS AND GYNECOLOGY | Facility: CLINIC | Age: 53
End: 2023-07-19
Payer: COMMERCIAL

## 2023-07-26 ENCOUNTER — TELEPHONE (OUTPATIENT)
Dept: OBSTETRICS AND GYNECOLOGY | Facility: CLINIC | Age: 53
End: 2023-07-26
Payer: COMMERCIAL

## 2023-08-09 ENCOUNTER — TELEPHONE (OUTPATIENT)
Dept: OBSTETRICS AND GYNECOLOGY | Facility: CLINIC | Age: 53
End: 2023-08-09
Payer: COMMERCIAL

## 2023-08-14 ENCOUNTER — TELEPHONE (OUTPATIENT)
Dept: OBSTETRICS AND GYNECOLOGY | Facility: CLINIC | Age: 53
End: 2023-08-14
Payer: COMMERCIAL

## 2023-10-13 ENCOUNTER — TELEPHONE (OUTPATIENT)
Dept: OBSTETRICS AND GYNECOLOGY | Facility: CLINIC | Age: 53
End: 2023-10-13
Payer: COMMERCIAL

## 2023-10-26 ENCOUNTER — PATIENT MESSAGE (OUTPATIENT)
Dept: OBSTETRICS AND GYNECOLOGY | Facility: CLINIC | Age: 53
End: 2023-10-26
Payer: COMMERCIAL

## 2023-11-06 ENCOUNTER — PATIENT MESSAGE (OUTPATIENT)
Dept: OBSTETRICS AND GYNECOLOGY | Facility: CLINIC | Age: 53
End: 2023-11-06
Payer: COMMERCIAL

## 2023-11-20 ENCOUNTER — PATIENT MESSAGE (OUTPATIENT)
Dept: OBSTETRICS AND GYNECOLOGY | Facility: CLINIC | Age: 53
End: 2023-11-20
Payer: COMMERCIAL